# Patient Record
Sex: FEMALE | Race: WHITE | NOT HISPANIC OR LATINO | Employment: OTHER | URBAN - NONMETROPOLITAN AREA
[De-identification: names, ages, dates, MRNs, and addresses within clinical notes are randomized per-mention and may not be internally consistent; named-entity substitution may affect disease eponyms.]

---

## 2023-05-09 ENCOUNTER — OFFICE VISIT (OUTPATIENT)
Dept: CARDIOLOGY | Facility: CLINIC | Age: 68
End: 2023-05-09
Payer: MEDICARE

## 2023-05-09 VITALS
HEIGHT: 64 IN | OXYGEN SATURATION: 98 % | SYSTOLIC BLOOD PRESSURE: 130 MMHG | DIASTOLIC BLOOD PRESSURE: 72 MMHG | HEART RATE: 120 BPM

## 2023-05-09 DIAGNOSIS — R07.9 CHEST PAIN, UNSPECIFIED TYPE: Primary | ICD-10-CM

## 2023-05-09 DIAGNOSIS — R06.02 SHORTNESS OF BREATH: ICD-10-CM

## 2023-05-09 DIAGNOSIS — R60.0 LOWER EXTREMITY EDEMA: ICD-10-CM

## 2023-05-09 PROCEDURE — 99204 OFFICE O/P NEW MOD 45 MIN: CPT | Performed by: PHYSICIAN ASSISTANT

## 2023-05-09 RX ORDER — ATENOLOL 25 MG/1
25 TABLET ORAL DAILY
COMMUNITY

## 2023-05-09 RX ORDER — CYCLOBENZAPRINE HCL 10 MG
TABLET ORAL
COMMUNITY
Start: 2023-01-12

## 2023-05-09 RX ORDER — PRAVASTATIN SODIUM 10 MG
TABLET ORAL
COMMUNITY
Start: 2023-04-25

## 2023-05-09 RX ORDER — MORPHINE SULFATE 60 MG/1
1 TABLET, FILM COATED, EXTENDED RELEASE ORAL EVERY 12 HOURS SCHEDULED
COMMUNITY
Start: 2023-04-15

## 2023-05-09 RX ORDER — PANTOPRAZOLE SODIUM 40 MG/1
40 TABLET, DELAYED RELEASE ORAL DAILY
Qty: 30 TABLET | Refills: 5 | Status: SHIPPED | OUTPATIENT
Start: 2023-05-09

## 2023-05-09 RX ORDER — LEVOTHYROXINE SODIUM 0.1 MG/1
TABLET ORAL
COMMUNITY
Start: 2023-02-28

## 2023-05-09 RX ORDER — OXYCODONE HYDROCHLORIDE 10 MG/1
TABLET ORAL
COMMUNITY
Start: 2023-05-08

## 2023-05-09 RX ORDER — FESOTERODINE FUMARATE 4 MG/1
TABLET, EXTENDED RELEASE ORAL AS NEEDED
COMMUNITY
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
## 2023-08-10 ENCOUNTER — OFFICE VISIT (OUTPATIENT)
Dept: CARDIOLOGY | Facility: CLINIC | Age: 68
End: 2023-08-10
Payer: MEDICARE

## 2023-08-10 VITALS
DIASTOLIC BLOOD PRESSURE: 86 MMHG | WEIGHT: 207 LBS | HEIGHT: 64 IN | SYSTOLIC BLOOD PRESSURE: 130 MMHG | OXYGEN SATURATION: 97 % | HEART RATE: 98 BPM | BODY MASS INDEX: 35.34 KG/M2

## 2023-08-10 DIAGNOSIS — R60.0 LOWER EXTREMITY EDEMA: ICD-10-CM

## 2023-08-10 DIAGNOSIS — R06.02 SHORTNESS OF BREATH: ICD-10-CM

## 2023-08-10 DIAGNOSIS — R07.9 CHEST PAIN, UNSPECIFIED TYPE: Primary | ICD-10-CM

## 2023-08-10 PROCEDURE — 99214 OFFICE O/P EST MOD 30 MIN: CPT | Performed by: PHYSICIAN ASSISTANT
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
## 2023-09-19 ENCOUNTER — HOSPITAL ENCOUNTER (OUTPATIENT)
Dept: CARDIOLOGY | Facility: HOSPITAL | Age: 68
Discharge: HOME | End: 2023-09-19
Payer: MEDICARE

## 2023-09-19 DIAGNOSIS — R60.0 LOWER EXTREMITY EDEMA: ICD-10-CM

## 2023-09-19 DIAGNOSIS — R06.02 SHORTNESS OF BREATH: ICD-10-CM

## 2023-09-19 DIAGNOSIS — R07.9 CHEST PAIN, UNSPECIFIED TYPE: ICD-10-CM

## 2023-09-19 PROCEDURE — 78452 HT MUSCLE IMAGE SPECT MULT: CPT

## 2023-09-19 PROCEDURE — 0 TECHNETIUM SESTAMIBI: Performed by: INTERNAL MEDICINE

## 2023-09-19 PROCEDURE — A9500 TC99M SESTAMIBI: HCPCS | Performed by: INTERNAL MEDICINE

## 2023-09-19 PROCEDURE — 25010000002 REGADENOSON 0.4 MG/5ML SOLUTION: Performed by: INTERNAL MEDICINE

## 2023-09-19 PROCEDURE — 93306 TTE W/DOPPLER COMPLETE: CPT

## 2023-09-19 PROCEDURE — 93017 CV STRESS TEST TRACING ONLY: CPT

## 2023-09-19 RX ORDER — REGADENOSON 0.08 MG/ML
0.4 INJECTION, SOLUTION INTRAVENOUS
Status: COMPLETED | OUTPATIENT
Start: 2023-09-19 | End: 2023-09-19

## 2023-09-19 RX ADMIN — REGADENOSON 0.4 MG: 0.08 INJECTION, SOLUTION INTRAVENOUS at 11:15

## 2023-09-19 RX ADMIN — TECHNETIUM TC 99M SESTAMIBI 1 DOSE: 1 INJECTION INTRAVENOUS at 10:14

## 2023-09-19 RX ADMIN — TECHNETIUM TC 99M SESTAMIBI 1 DOSE: 1 INJECTION INTRAVENOUS at 11:15

## 2023-09-21 ENCOUNTER — TELEPHONE (OUTPATIENT)
Dept: CARDIOLOGY | Facility: CLINIC | Age: 68
End: 2023-09-21

## 2023-09-21 LAB
BH CV REST NUCLEAR ISOTOPE DOSE: 10 MCI
BH CV STRESS COMMENTS STAGE 1: NORMAL
BH CV STRESS DOSE REGADENOSON STAGE 1: 0.4
BH CV STRESS DURATION MIN STAGE 1: 0
BH CV STRESS DURATION SEC STAGE 1: 10
BH CV STRESS NUCLEAR ISOTOPE DOSE: 30 MCI
BH CV STRESS PROTOCOL 1: NORMAL
BH CV STRESS RECOVERY BP: NORMAL MMHG
BH CV STRESS RECOVERY HR: 109 BPM
BH CV STRESS STAGE 1: 1
MAXIMAL PREDICTED HEART RATE: 150 BPM
PERCENT MAX PREDICTED HR: 74 %
STRESS BASELINE BP: NORMAL MMHG
STRESS BASELINE HR: 102 BPM
STRESS PERCENT HR: 87 %
STRESS POST PEAK BP: NORMAL MMHG
STRESS POST PEAK HR: 111 BPM
STRESS TARGET HR: 128 BPM

## 2023-09-21 NOTE — TELEPHONE ENCOUNTER
Tried to contact patient no answer, not able to leave VM.    Patient aware will be called with jackelyn time and date. Sent to Jeanne for scheduling.           ----- Message from YESSI Retana sent at 9/21/2023  2:54 PM EDT -----  1 week follow-up    Result Text  1.  Scintigraphy demonstrates a small to moderately sized, moderately dense but completely reversible anterolateral defect compatible with ischemia.     2.  Preserved post stress ejection fraction of 66% with no focal wall motion abnormalities.     3.  Elevated transient ischemic dilation ratio of 1.24 suggestive of multivessel coronary disease.  No evidence of increased lung uptake of radiopharmaceutical to implicate increased LV filling pressures.

## 2023-09-23 LAB
BH CV ECHO MEAS - ACS: 2 CM
BH CV ECHO MEAS - AO MAX PG: 8.2 MMHG
BH CV ECHO MEAS - AO MEAN PG: 5 MMHG
BH CV ECHO MEAS - AO ROOT DIAM: 2.8 CM
BH CV ECHO MEAS - AO V2 MAX: 143 CM/SEC
BH CV ECHO MEAS - AO V2 VTI: 25.9 CM
BH CV ECHO MEAS - EDV(CUBED): 79.5 ML
BH CV ECHO MEAS - EDV(MOD-SP4): 69.8 ML
BH CV ECHO MEAS - EF(MOD-SP4): 74.9 %
BH CV ECHO MEAS - EF_3D-VOL: 55 %
BH CV ECHO MEAS - ESV(CUBED): 18.8 ML
BH CV ECHO MEAS - ESV(MOD-SP4): 17.5 ML
BH CV ECHO MEAS - FS: 38.1 %
BH CV ECHO MEAS - IVS/LVPW: 0.81 CM
BH CV ECHO MEAS - IVSD: 1.16 CM
BH CV ECHO MEAS - LA DIMENSION: 3.7 CM
BH CV ECHO MEAS - LAT PEAK E' VEL: 6.7 CM/SEC
BH CV ECHO MEAS - LV DIASTOLIC VOL/BSA (35-75): 35.2 CM2
BH CV ECHO MEAS - LV MASS(C)D: 207.8 GRAMS
BH CV ECHO MEAS - LV SYSTOLIC VOL/BSA (12-30): 8.8 CM2
BH CV ECHO MEAS - LVIDD: 4.3 CM
BH CV ECHO MEAS - LVIDS: 2.7 CM
BH CV ECHO MEAS - LVPWD: 1.44 CM
BH CV ECHO MEAS - MED PEAK E' VEL: 7.3 CM/SEC
BH CV ECHO MEAS - MV A MAX VEL: 96.8 CM/SEC
BH CV ECHO MEAS - MV DEC TIME: 0.17 SEC
BH CV ECHO MEAS - MV E MAX VEL: 114 CM/SEC
BH CV ECHO MEAS - MV E/A: 1.18
BH CV ECHO MEAS - SI(MOD-SP4): 26.3 ML/M2
BH CV ECHO MEAS - SV(MOD-SP4): 52.3 ML
BH CV ECHO MEASUREMENTS AVERAGE E/E' RATIO: 16.29
BH CV XLRA - RV BASE: 2.8 CM
BH CV XLRA - RV LENGTH: 7.1 CM
BH CV XLRA - RV MID: 1.96 CM
LEFT ATRIUM VOLUME INDEX: 14.2 ML/M2

## 2023-09-26 ENCOUNTER — OFFICE VISIT (OUTPATIENT)
Dept: CARDIOLOGY | Facility: CLINIC | Age: 68
End: 2023-09-26
Payer: MEDICARE

## 2023-09-26 VITALS
DIASTOLIC BLOOD PRESSURE: 79 MMHG | BODY MASS INDEX: 36.7 KG/M2 | OXYGEN SATURATION: 96 % | SYSTOLIC BLOOD PRESSURE: 142 MMHG | WEIGHT: 215 LBS | HEART RATE: 111 BPM | HEIGHT: 64 IN

## 2023-09-26 DIAGNOSIS — R94.39 ABNORMAL STRESS TEST: ICD-10-CM

## 2023-09-26 DIAGNOSIS — R06.02 SHORTNESS OF BREATH: ICD-10-CM

## 2023-09-26 DIAGNOSIS — R07.9 CHEST PAIN, UNSPECIFIED TYPE: Primary | ICD-10-CM

## 2023-09-26 PROCEDURE — 99214 OFFICE O/P EST MOD 30 MIN: CPT | Performed by: PHYSICIAN ASSISTANT

## 2023-09-26 RX ORDER — ASPIRIN 81 MG/1
81 TABLET ORAL DAILY
Qty: 30 TABLET | Refills: 5 | Status: SHIPPED | OUTPATIENT
Start: 2023-09-26 | End: 2023-09-28 | Stop reason: HOSPADM

## 2023-09-26 RX ORDER — NITROGLYCERIN 0.4 MG/1
TABLET SUBLINGUAL
Qty: 25 TABLET | Refills: 11 | Status: SHIPPED | OUTPATIENT
Start: 2023-09-26
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
## 2023-09-27 ENCOUNTER — PREP FOR SURGERY (OUTPATIENT)
Dept: OTHER | Facility: HOSPITAL | Age: 68
End: 2023-09-27
Payer: MEDICARE

## 2023-09-27 DIAGNOSIS — R07.9 CHEST PAIN, UNSPECIFIED TYPE: ICD-10-CM

## 2023-09-27 DIAGNOSIS — R94.39 ABNORMAL STRESS TEST: Primary | ICD-10-CM

## 2023-09-27 RX ORDER — SODIUM CHLORIDE 0.9 % (FLUSH) 0.9 %
10 SYRINGE (ML) INJECTION EVERY 12 HOURS SCHEDULED
Status: CANCELLED | OUTPATIENT
Start: 2023-09-27

## 2023-09-27 RX ORDER — SODIUM CHLORIDE 9 MG/ML
40 INJECTION, SOLUTION INTRAVENOUS AS NEEDED
Status: CANCELLED | OUTPATIENT
Start: 2023-09-27

## 2023-09-27 RX ORDER — BUSPIRONE HYDROCHLORIDE 5 MG/1
5 TABLET ORAL 3 TIMES DAILY PRN
Qty: 30 TABLET | Refills: 0 | Status: SHIPPED | OUTPATIENT
Start: 2023-09-27

## 2023-09-27 RX ORDER — SODIUM CHLORIDE 0.9 % (FLUSH) 0.9 %
10 SYRINGE (ML) INJECTION AS NEEDED
Status: CANCELLED | OUTPATIENT
Start: 2023-09-27

## 2023-09-27 RX ORDER — ASPIRIN 81 MG/1
324 TABLET, CHEWABLE ORAL ONCE
Status: CANCELLED | OUTPATIENT
Start: 2023-09-27 | End: 2023-09-27

## 2023-09-27 RX ORDER — ASPIRIN 81 MG/1
81 TABLET ORAL DAILY
Status: CANCELLED | OUTPATIENT
Start: 2023-09-28

## 2023-09-28 ENCOUNTER — HOSPITAL ENCOUNTER (OUTPATIENT)
Facility: HOSPITAL | Age: 68
Setting detail: HOSPITAL OUTPATIENT SURGERY
Discharge: HOME | End: 2023-09-28
Attending: INTERNAL MEDICINE | Admitting: INTERNAL MEDICINE
Payer: MEDICARE

## 2023-09-28 VITALS
BODY MASS INDEX: 34.82 KG/M2 | WEIGHT: 203.93 LBS | DIASTOLIC BLOOD PRESSURE: 93 MMHG | TEMPERATURE: 98.7 F | HEIGHT: 64 IN | OXYGEN SATURATION: 96 % | RESPIRATION RATE: 18 BRPM | SYSTOLIC BLOOD PRESSURE: 122 MMHG | HEART RATE: 98 BPM

## 2023-09-28 DIAGNOSIS — R06.02 SHORTNESS OF BREATH: ICD-10-CM

## 2023-09-28 DIAGNOSIS — E78.5 HYPERLIPIDEMIA LDL GOAL <70: ICD-10-CM

## 2023-09-28 DIAGNOSIS — I50.33 ACUTE ON CHRONIC HEART FAILURE WITH PRESERVED EJECTION FRACTION (HFPEF): Primary | ICD-10-CM

## 2023-09-28 DIAGNOSIS — R94.39 ABNORMAL STRESS TEST: ICD-10-CM

## 2023-09-28 DIAGNOSIS — R07.9 CHEST PAIN, UNSPECIFIED TYPE: ICD-10-CM

## 2023-09-28 PROBLEM — I20.9 ANGINA PECTORIS: Status: ACTIVE | Noted: 2023-05-09

## 2023-09-28 PROBLEM — I20.89 ATYPICAL ANGINA: Status: ACTIVE | Noted: 2023-05-09

## 2023-09-28 PROBLEM — I25.119 CORONARY ARTERY DISEASE INVOLVING NATIVE CORONARY ARTERY OF NATIVE HEART WITH ANGINA PECTORIS: Status: ACTIVE | Noted: 2023-05-09

## 2023-09-28 PROBLEM — I10 ESSENTIAL HYPERTENSION: Status: ACTIVE | Noted: 2023-09-28

## 2023-09-28 PROBLEM — R60.0 LOWER EXTREMITY EDEMA: Status: RESOLVED | Noted: 2023-05-09 | Resolved: 2023-09-28

## 2023-09-28 LAB
ALBUMIN SERPL-MCNC: 4.1 G/DL (ref 3.5–5.2)
ALBUMIN/GLOB SERPL: 1.4 G/DL
ALP SERPL-CCNC: 93 U/L (ref 39–117)
ALT SERPL W P-5'-P-CCNC: 14 U/L (ref 1–33)
ANION GAP SERPL CALCULATED.3IONS-SCNC: 15 MMOL/L (ref 5–15)
ANION GAP SERPL CALCULATED.3IONS-SCNC: 16 MMOL/L (ref 5–15)
AST SERPL-CCNC: 17 U/L (ref 1–32)
BILIRUB SERPL-MCNC: 0.2 MG/DL (ref 0–1.2)
BUN BLDA-MCNC: 10 MG/DL (ref 8–26)
BUN SERPL-MCNC: 11 MG/DL (ref 8–23)
BUN SERPL-MCNC: 11 MG/DL (ref 8–23)
BUN/CREAT SERPL: 16.4 (ref 7–25)
BUN/CREAT SERPL: 16.4 (ref 7–25)
CA-I BLDA-SCNC: 1.16 MMOL/L (ref 1.2–1.32)
CALCIUM SPEC-SCNC: 9.1 MG/DL (ref 8.6–10.5)
CALCIUM SPEC-SCNC: 9.3 MG/DL (ref 8.6–10.5)
CHLORIDE BLDA-SCNC: 108 MMOL/L (ref 98–109)
CHLORIDE SERPL-SCNC: 109 MMOL/L (ref 98–107)
CHLORIDE SERPL-SCNC: 109 MMOL/L (ref 98–107)
CHOLEST SERPL-MCNC: 163 MG/DL (ref 0–200)
CO2 BLDA-SCNC: 21 MMOL/L (ref 24–29)
CO2 SERPL-SCNC: 20 MMOL/L (ref 22–29)
CO2 SERPL-SCNC: 21 MMOL/L (ref 22–29)
CREAT BLDA-MCNC: 0.7 MG/DL (ref 0.6–1.3)
CREAT SERPL-MCNC: 0.67 MG/DL (ref 0.57–1)
CREAT SERPL-MCNC: 0.67 MG/DL (ref 0.57–1)
DEPRECATED RDW RBC AUTO: 44.8 FL (ref 37–54)
EGFRCR SERPLBLD CKD-EPI 2021: 93.2 ML/MIN/1.73
EGFRCR SERPLBLD CKD-EPI 2021: 94.2 ML/MIN/1.73
EGFRCR SERPLBLD CKD-EPI 2021: 94.2 ML/MIN/1.73
ERYTHROCYTE [DISTWIDTH] IN BLOOD BY AUTOMATED COUNT: 12.8 % (ref 12.3–15.4)
GLOBULIN UR ELPH-MCNC: 3 GM/DL
GLUCOSE BLDC GLUCOMTR-MCNC: 122 MG/DL (ref 70–130)
GLUCOSE SERPL-MCNC: 124 MG/DL (ref 65–99)
GLUCOSE SERPL-MCNC: 130 MG/DL (ref 65–99)
HBA1C MFR BLD: 5.1 % (ref 4.8–5.6)
HCT VFR BLD AUTO: 43.8 % (ref 34–46.6)
HCT VFR BLDA CALC: 43 % (ref 38–51)
HDLC SERPL-MCNC: 65 MG/DL (ref 40–60)
HGB BLD-MCNC: 14.4 G/DL (ref 12–15.9)
HGB BLDA-MCNC: 14.6 G/DL (ref 12–17)
LDLC SERPL CALC-MCNC: 79 MG/DL (ref 0–100)
LDLC/HDLC SERPL: 1.18 {RATIO}
MCH RBC QN AUTO: 31.2 PG (ref 26.6–33)
MCHC RBC AUTO-ENTMCNC: 32.9 G/DL (ref 31.5–35.7)
MCV RBC AUTO: 94.8 FL (ref 79–97)
PLATELET # BLD AUTO: 344 10*3/MM3 (ref 140–450)
PMV BLD AUTO: 8.8 FL (ref 6–12)
POTASSIUM BLDA-SCNC: 3.1 MMOL/L (ref 3.5–4.9)
POTASSIUM SERPL-SCNC: 3.3 MMOL/L (ref 3.5–5.2)
POTASSIUM SERPL-SCNC: 3.3 MMOL/L (ref 3.5–5.2)
PROT SERPL-MCNC: 7.1 G/DL (ref 6–8.5)
RBC # BLD AUTO: 4.62 10*6/MM3 (ref 3.77–5.28)
SODIUM BLD-SCNC: 143 MMOL/L (ref 138–146)
SODIUM SERPL-SCNC: 145 MMOL/L (ref 136–145)
SODIUM SERPL-SCNC: 145 MMOL/L (ref 136–145)
TRIGL SERPL-MCNC: 107 MG/DL (ref 0–150)
VLDLC SERPL-MCNC: 19 MG/DL (ref 5–40)
WBC NRBC COR # BLD: 12.33 10*3/MM3 (ref 3.4–10.8)

## 2023-09-28 PROCEDURE — 85027 COMPLETE CBC AUTOMATED: CPT

## 2023-09-28 PROCEDURE — 83036 HEMOGLOBIN GLYCOSYLATED A1C: CPT

## 2023-09-28 PROCEDURE — 80047 BASIC METABLC PNL IONIZED CA: CPT

## 2023-09-28 PROCEDURE — C1894 INTRO/SHEATH, NON-LASER: HCPCS | Performed by: INTERNAL MEDICINE

## 2023-09-28 PROCEDURE — 25010000002 MIDAZOLAM PER 1 MG: Performed by: INTERNAL MEDICINE

## 2023-09-28 PROCEDURE — 25010000002 FENTANYL CITRATE (PF) 50 MCG/ML SOLUTION: Performed by: INTERNAL MEDICINE

## 2023-09-28 PROCEDURE — 80053 COMPREHEN METABOLIC PANEL: CPT

## 2023-09-28 PROCEDURE — C1769 GUIDE WIRE: HCPCS | Performed by: INTERNAL MEDICINE

## 2023-09-28 PROCEDURE — 25510000001 IOPAMIDOL PER 1 ML: Performed by: INTERNAL MEDICINE

## 2023-09-28 PROCEDURE — 80061 LIPID PANEL: CPT

## 2023-09-28 PROCEDURE — 25010000002 HEPARIN (PORCINE) PER 1000 UNITS: Performed by: INTERNAL MEDICINE

## 2023-09-28 PROCEDURE — 93458 L HRT ARTERY/VENTRICLE ANGIO: CPT | Performed by: INTERNAL MEDICINE

## 2023-09-28 PROCEDURE — 85014 HEMATOCRIT: CPT

## 2023-09-28 RX ORDER — POTASSIUM CHLORIDE 750 MG/1
20 CAPSULE, EXTENDED RELEASE ORAL ONCE
Status: COMPLETED | OUTPATIENT
Start: 2023-09-28 | End: 2023-09-28

## 2023-09-28 RX ORDER — LIDOCAINE HYDROCHLORIDE 10 MG/ML
INJECTION, SOLUTION EPIDURAL; INFILTRATION; INTRACAUDAL; PERINEURAL
Status: DISCONTINUED | OUTPATIENT
Start: 2023-09-28 | End: 2023-09-28 | Stop reason: HOSPADM

## 2023-09-28 RX ORDER — ASPIRIN 81 MG/1
324 TABLET, CHEWABLE ORAL ONCE
Status: COMPLETED | OUTPATIENT
Start: 2023-09-28 | End: 2023-09-28

## 2023-09-28 RX ORDER — SODIUM CHLORIDE 0.9 % (FLUSH) 0.9 %
10 SYRINGE (ML) INJECTION AS NEEDED
Status: DISCONTINUED | OUTPATIENT
Start: 2023-09-28 | End: 2023-09-28 | Stop reason: HOSPADM

## 2023-09-28 RX ORDER — DIAZEPAM 5 MG/1
5 TABLET ORAL ONCE
Status: COMPLETED | OUTPATIENT
Start: 2023-09-28 | End: 2023-09-28

## 2023-09-28 RX ORDER — FENTANYL CITRATE 50 UG/ML
INJECTION, SOLUTION INTRAMUSCULAR; INTRAVENOUS
Status: DISCONTINUED | OUTPATIENT
Start: 2023-09-28 | End: 2023-09-28 | Stop reason: HOSPADM

## 2023-09-28 RX ORDER — HEPARIN SODIUM 1000 [USP'U]/ML
INJECTION, SOLUTION INTRAVENOUS; SUBCUTANEOUS
Status: DISCONTINUED | OUTPATIENT
Start: 2023-09-28 | End: 2023-09-28 | Stop reason: HOSPADM

## 2023-09-28 RX ORDER — MIDAZOLAM HYDROCHLORIDE 1 MG/ML
INJECTION INTRAMUSCULAR; INTRAVENOUS
Status: DISCONTINUED | OUTPATIENT
Start: 2023-09-28 | End: 2023-09-28 | Stop reason: HOSPADM

## 2023-09-28 RX ORDER — FUROSEMIDE 20 MG/1
20 TABLET ORAL DAILY
Qty: 30 TABLET | Refills: 1 | Status: SHIPPED | OUTPATIENT
Start: 2023-09-28

## 2023-09-28 RX ORDER — SODIUM CHLORIDE 9 MG/ML
40 INJECTION, SOLUTION INTRAVENOUS AS NEEDED
Status: DISCONTINUED | OUTPATIENT
Start: 2023-09-28 | End: 2023-09-28 | Stop reason: HOSPADM

## 2023-09-28 RX ORDER — ROSUVASTATIN CALCIUM 10 MG/1
10 TABLET, COATED ORAL DAILY
Qty: 30 TABLET | Refills: 1 | Status: SHIPPED | OUTPATIENT
Start: 2023-09-28

## 2023-09-28 RX ORDER — SODIUM CHLORIDE 0.9 % (FLUSH) 0.9 %
10 SYRINGE (ML) INJECTION EVERY 12 HOURS SCHEDULED
Status: DISCONTINUED | OUTPATIENT
Start: 2023-09-28 | End: 2023-09-28 | Stop reason: HOSPADM

## 2023-09-28 RX ORDER — POTASSIUM CHLORIDE 750 MG/1
10 TABLET, FILM COATED, EXTENDED RELEASE ORAL DAILY
Qty: 30 TABLET | Refills: 1 | Status: SHIPPED | OUTPATIENT
Start: 2023-09-28

## 2023-09-28 RX ORDER — NICARDIPINE HCL-0.9% SOD CHLOR 1 MG/10 ML
SYRINGE (ML) INTRAVENOUS
Status: DISCONTINUED | OUTPATIENT
Start: 2023-09-28 | End: 2023-09-28 | Stop reason: HOSPADM

## 2023-09-28 RX ORDER — ASPIRIN 81 MG/1
81 TABLET ORAL DAILY
Status: DISCONTINUED | OUTPATIENT
Start: 2023-09-29 | End: 2023-09-28 | Stop reason: HOSPADM

## 2023-09-28 RX ADMIN — POTASSIUM CHLORIDE 20 MEQ: 750 CAPSULE, EXTENDED RELEASE ORAL at 10:21

## 2023-09-28 RX ADMIN — SODIUM CHLORIDE 292.5 ML: 9 INJECTION, SOLUTION INTRAVENOUS at 10:02

## 2023-09-28 RX ADMIN — ASPIRIN 81 MG CHEWABLE TABLET 324 MG: 81 TABLET CHEWABLE at 10:01

## 2023-09-28 RX ADMIN — DIAZEPAM 5 MG: 5 TABLET ORAL at 10:21

## 2023-09-28 NOTE — INTERVAL H&P NOTE
H&P reviewed. The patient was examined and there are no changes to the H&P.      Only change physical exam is patient is noted to be tachycardic on telemetry and hypertensive.  Otherwise no changes      Active Hospital Problems    Diagnosis     **Angina pectoris      Pharmacologic nuclear stress (9/2023): Anterior lateral ischemia. TID present.  Echo (9/19/2023): LVEF 58%.  Grade 2 diastolic dysfunction.  No significant valvular abnormality      Abnormal stress test      Pharmacologic nuclear stress (9/2023): Anterior lateral ischemia. TID present.  Echo (9/19/2023): LVEF 58%.  Grade 2 diastolic dysfunction.  No significant valvular abnormality      Shortness of breath     Essential hypertension     Dyslipidemia    Patient is a 68-year-old female who is being referred by YESSI Wallis to undergo cardiac catheterization for abnormal stress test that suggest anterior lateral ischemia and 3 times daily present raising possibility of multivessel CAD.  The patient has been having intermittent chest pressure and shortness of breath with exertional activities.  The risks and benefits of the procedure were discussed and the patient is agreeable to proceed.  Of note she has been taking daily 81 mg aspirin but has never been on a P2 Y12 inhibitor.      Plan:  Lab results pending and if acceptable proceed with cardiac cath via the right radial approach  Patient to take home dose of atenolol now  Give 5 mg p.o. Valium x1 now  Further recommendations to follow    REED Garcia

## 2023-10-11 ENCOUNTER — OFFICE VISIT (OUTPATIENT)
Dept: CARDIOLOGY | Facility: CLINIC | Age: 68
End: 2023-10-11
Payer: MEDICARE

## 2023-10-11 ENCOUNTER — TELEPHONE (OUTPATIENT)
Dept: CARDIOLOGY | Facility: CLINIC | Age: 68
End: 2023-10-11

## 2023-10-11 VITALS
SYSTOLIC BLOOD PRESSURE: 126 MMHG | DIASTOLIC BLOOD PRESSURE: 70 MMHG | HEIGHT: 64 IN | HEART RATE: 107 BPM | WEIGHT: 204 LBS | BODY MASS INDEX: 34.83 KG/M2 | OXYGEN SATURATION: 96 %

## 2023-10-11 DIAGNOSIS — I50.32 CHRONIC DIASTOLIC HEART FAILURE: ICD-10-CM

## 2023-10-11 DIAGNOSIS — I25.10 CORONARY ARTERY DISEASE INVOLVING NATIVE CORONARY ARTERY OF NATIVE HEART WITHOUT ANGINA PECTORIS: Primary | ICD-10-CM

## 2023-10-11 DIAGNOSIS — E78.00 PURE HYPERCHOLESTEROLEMIA: ICD-10-CM

## 2023-10-11 PROCEDURE — 3074F SYST BP LT 130 MM HG: CPT | Performed by: PHYSICIAN ASSISTANT

## 2023-10-11 PROCEDURE — 3078F DIAST BP <80 MM HG: CPT | Performed by: PHYSICIAN ASSISTANT

## 2023-10-11 PROCEDURE — 99214 OFFICE O/P EST MOD 30 MIN: CPT | Performed by: PHYSICIAN ASSISTANT

## 2023-10-11 RX ORDER — PRAVASTATIN SODIUM 10 MG
10 TABLET ORAL DAILY
Qty: 90 TABLET | Refills: 3 | Status: SHIPPED | OUTPATIENT
Start: 2023-10-11

## 2023-10-11 NOTE — TELEPHONE ENCOUNTER
Notified patient that potassium supplement is noted on med list and Bill Mueller PA-C is aware. Potassium low on recent labs. Patient has active lab orders to repeat. Explained the benefits of Entresto to patient.
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
## 2023-10-11 NOTE — TELEPHONE ENCOUNTER
Caller: Alida Phillips    Relationship: Self    Best call back number: [phone]    What medications are you currently taking:   Current Outpatient Medications on File Prior to Visit   Medication Sig Dispense Refill    atenolol (TENORMIN) 25 MG tablet Take 1 tablet by mouth Daily.      cyclobenzaprine (FLEXERIL) 10 MG tablet Take 1 tablet by mouth 3 (Three) Times a Day As Needed.      fesoterodine fumarate (TOVIAZ ER) 4 MG tablet sustained-release 24 hour tablet Take  by mouth As Needed.      furosemide (LASIX) 20 MG tablet Take 1 tablet by mouth Daily. 30 tablet 1    levothyroxine (SYNTHROID, LEVOTHROID) 100 MCG tablet Take 1 tablet by mouth Daily.      Morphine (MS CONTIN) 60 MG 12 hr tablet Take 1 tablet by mouth Every 12 (Twelve) Hours.      nitroglycerin (NITROSTAT) 0.4 MG SL tablet 1 under the tongue as needed for angina, may repeat q5mins for up three doses 25 tablet 11    oxyCODONE (ROXICODONE) 10 MG tablet Take 1 tablet by mouth Every 6 (Six) Hours As Needed.      pantoprazole (Protonix) 40 MG EC tablet Take 1 tablet by mouth Daily. 30 tablet 5    potassium chloride 10 MEQ CR tablet Take 1 tablet by mouth Daily. 30 tablet 1    pravastatin (PRAVACHOL) 10 MG tablet Take 1 tablet by mouth Daily. 90 tablet 3    sacubitril-valsartan (ENTRESTO) 24-26 MG tablet Take 1 tablet by mouth 2 (Two) Times a Day. 180 tablet 3    sacubitril-valsartan (ENTRESTO) 24-26 MG tablet Take 1 tablet by mouth 2 (Two) Times a Day. 60 tablet 0    Semaglutide,0.25 or 0.5MG/DOS, (OZEMPIC) 2 MG/3ML solution pen-injector Inject 0.25 mg under the skin into the appropriate area as directed 1 (One) Time Per Week. 1 mL 11    [DISCONTINUED] busPIRone (BUSPAR) 5 MG tablet Take 1 tablet by mouth 3 (Three) Times a Day As Needed (anxiety). 30 tablet 0    [DISCONTINUED] empagliflozin (Jardiance) 10 MG tablet tablet Take 1 tablet by mouth Daily. 90 tablet 1    [DISCONTINUED] rosuvastatin (CRESTOR) 10 MG tablet Take 1 tablet by mouth Daily. 30  tablet 1    [DISCONTINUED] Semaglutide,0.25 or 0.5MG/DOS, (OZEMPIC) 2 MG/3ML solution pen-injector Inject 0.25 mg under the skin into the appropriate area as directed 1 (One) Time Per Week. 1 mL 11     No current facility-administered medications on file prior to visit.          When did you start taking these medications: HASN'T PICKED UP THE MEDICATION    Which medication are you concerned about: JASONSTO    Who prescribed you this medication: MELY BABIN    What are your concerns: PATIENT TAKES A POTASSIUM SUPPLEMENT AND SHE STATED SHE WOULD HAVE TO BE MONITORED ON THIS MEDICATION (ENTRESTO) AND THAT WAS NEVER DISCUSSED. SHE WANTS TO VERIFY THAT MELY IS AWARE OF THE POTASSIUM SUPPLEMENT BEFORE SHE TAKES THE ENTRESTO PLEASE CALL HER    How long have you had these concerns: TODAY
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
## 2023-10-12 ENCOUNTER — TELEPHONE (OUTPATIENT)
Dept: CARDIOLOGY | Facility: CLINIC | Age: 68
End: 2023-10-12
Payer: MEDICARE

## 2023-10-12 NOTE — TELEPHONE ENCOUNTER
Advised to have prior authorization sent to PCP or have PCP prescribe medication with it not being cardiac related.

## 2023-10-12 NOTE — TELEPHONE ENCOUNTER
Caller: Alida Phillips    Relationship: Self    Best call back number: [phone]    What was the call regarding: PATIENT WAS NOTIFIED BY [facility] THAT THE OZEMPIC NEEDS A PRIOR AUTHORIZATION.

## 2023-10-23 ENCOUNTER — TELEPHONE (OUTPATIENT)
Dept: CARDIOLOGY | Facility: CLINIC | Age: 68
End: 2023-10-23

## 2023-10-23 NOTE — TELEPHONE ENCOUNTER
----- Message from YESSI Retana sent at 10/23/2023 10:16 AM EDT -----  Regarding: MJ: Ganesh  Contact: [phone]  Can you call her? Let her know that I am aware of the entresto and potassium. We generally order labs after we start someone on it in like a week or two to ensure her potassium and electrolytes stay within limits.  If that was not done, can you do that?  Let her know that symptoms we are busy and can always get to the phone but she can send me a message through [employer] and I can respond  ----- Message -----  From: Aleida Arango RegSched Rep  Sent: 10/23/2023   8:27 AM EDT  To: YESSI Retana; Rachel Garrido MA  Subject: FW: Entresto                                       ----- Message -----  From: Alida Phillips  Sent: 10/22/2023   5:35 PM EDT  To: [facility]  Subject: Entresto                                         Entresto warns against taking with potassium supplements and even states again that it is a severe warning. I tried to call & speak with you Bill but was told by secretaries that the warning was in the chart so you, Bill are aware of the severe warning by the Entresto people.  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 Therefore I am taking Jardiance 10 mg as Dr Montalvo prescribed for me.  The other medications remain the same, but the secretaries declined to persue the Ozempic because even though you ordered the medication they decided that that type of medication isn’t dealt with there. No problem.  Bill I am quite fond of you and Rachel, I do hope in the future to have access to one of you for my medical questions.

## 2023-10-23 NOTE — TELEPHONE ENCOUNTER
Patient made aware of recommendations. Wants to know if she can just stay on the Jardiance, she never started the Entresto.

## 2023-10-30 ENCOUNTER — TELEPHONE (OUTPATIENT)
Dept: CARDIOLOGY | Facility: CLINIC | Age: 68
End: 2023-10-30
Payer: MEDICARE

## 2023-10-30 NOTE — TELEPHONE ENCOUNTER
Sent to Pharmacy, patient aware. While speaking to patient, she wanted me to discuss how to use nitro. I advised instructions, she states she had a pain in breast area and was concerned it was her heart, she is aware with any new or worsening sx go to ER.    Rachel Garrido MA

## 2023-10-30 NOTE — TELEPHONE ENCOUNTER
----- Message from Alida Phillips sent at 10/28/2023  9:08 PM EDT -----  Regarding: Refill  Contact: [phone]  Bill I need a prescription for djardiance 10mg 1x daily [facility]. I usually get a 90 day supply with 3 refills. Can you do this? If not who should I contact. I have about a 30 day supply but [facility] takes about 2-3 weeks to deliver. Thank you. Alida Phillips

## 2023-11-01 ENCOUNTER — TELEPHONE (OUTPATIENT)
Dept: CARDIOLOGY | Facility: CLINIC | Age: 68
End: 2023-11-01
Payer: MEDICARE

## 2023-11-01 NOTE — TELEPHONE ENCOUNTER
----- Message from Mary Bell sent at 10/31/2023  4:40 PM EDT -----  Regarding: FW: Follow up refill question for Rachel  Contact: [phone]    ----- Message -----  From: Alida Phillips  Sent: 10/31/2023   4:27 PM EDT  To: e Card [facility]  Subject: Follow up refill question for Rachel Ramos could you give me a quick call about my potassium? No emergency, just follow up. I am supposed to take potassium everyday? Thanks

## 2023-11-01 NOTE — TELEPHONE ENCOUNTER
Called and notified patient, per chart K daily, along with Lasix ordered by .      Rachel Garrido MA

## 2023-11-20 ENCOUNTER — TELEPHONE (OUTPATIENT)
Dept: CARDIOLOGY | Facility: CLINIC | Age: 68
End: 2023-11-20
Payer: MEDICARE

## 2023-11-20 NOTE — TELEPHONE ENCOUNTER
I called and made patient aware of recommendations. She states would like me to speak with Bill on Entresto and with thierry HALL.

## 2023-11-20 NOTE — TELEPHONE ENCOUNTER
----- Message from YESSI Retana sent at 11/20/2023  4:02 PM EST -----  Regarding: FW: Djardiance or other medication side effect  Contact: [phone]  I do not know of any issues of Jardiance causing joint issues.  The Lasix is because of the heart failure.  I would rather her be on Entresto then Jardiance.  I know she had read the effects of Entresto and was concerned.  It is one of the best medicines on the market for this condition and all the [facility] [place] is like [facility] and [facility] use that medication.  If she wants to stop Jardiance, she can and let us know if her symptoms improve.  ----- Message -----  From: Carol Davis LPN  Sent: 11/20/2023  10:56 AM EST  To: YESSI Retana  Subject: FW: Djardiance or other medication side effe#      ----- Message -----  From: Alida Phillips  Sent: 11/20/2023  10:51 AM EST  To: Ava Gonsales [facility]  Subject: Djardiance or other medication side effect       Bill, please read my last message about the possible side effect of Djardiance. Since then the Doxycycline has helped the toe that exploded with blood & pus. The great toe remains sore in what I would describe as the tendons. Last night I experienced soreness in the joint at the base of my left thumb. Is it possible that one of the new “heart” meds could be causing this attack on my joints. Or one of the meds added by you cardiac  docs such as potassium or lasik? Please let me know as this is painful and alarming. If not I can work with my family doc, but if it is one of the new meds I want it changed please.

## 2023-11-22 NOTE — TELEPHONE ENCOUNTER
Patient states she does not want to make any changes to her medication at this time, Bill stated if she was willing to start Entresto her K would be monitored. She verbally understands, and would like to just stick with Jardiance til her next follow up with him. Will call office with anymore questions or concerns.    Rachel Garrido MA

## 2023-11-28 ENCOUNTER — TELEPHONE (OUTPATIENT)
Dept: CARDIOLOGY | Facility: CLINIC | Age: 68
End: 2023-11-28
Payer: MEDICARE

## 2023-11-28 NOTE — TELEPHONE ENCOUNTER
LABS  Pt notified of no acute findings. Provider will discuss results at f/u. Pt reminded of appt date and time.  ----- Message from Rachel Garrido MA sent at 11/27/2023  9:31 AM EST -----    ----- Message -----  From: Bill Mueller PA  Sent: 11/27/2023   9:22 AM EST  To: Rachel Garrido MA    Let her know that her labs look good

## 2023-12-13 ENCOUNTER — TELEPHONE (OUTPATIENT)
Dept: CARDIOLOGY | Facility: CLINIC | Age: 68
End: 2023-12-13
Payer: MEDICARE

## 2023-12-13 NOTE — TELEPHONE ENCOUNTER
----- Message from Alida Phillips sent at 12/13/2023  1:26 PM EST -----  Regarding: Medication change?  Contact: [phone]  Dr Billy Khan gave me 2 medication changes. I want to run them by you. I have the medications but will wait for your response before I take them. She changed my ateneol because she felt my heart rate was too high & felt I needed an extended release. Heart rate was 110-115. She also gave me something other than lasik.  You know I find that very limiting in my life.She prescribed Toprol ER tabs 50 mg and Demadex 20mg.  Before I started taking these meds and deleting the lasik & Atenol  I wanted to alert you & get your input. Thank you so much. Caitie Espinoza! Alida Phillips

## 2023-12-14 RX ORDER — TORSEMIDE 20 MG/1
20 TABLET ORAL DAILY
COMMUNITY

## 2023-12-14 RX ORDER — METOPROLOL SUCCINATE 50 MG/1
50 TABLET, EXTENDED RELEASE ORAL DAILY
COMMUNITY

## 2024-01-04 ENCOUNTER — TELEPHONE (OUTPATIENT)
Dept: PULMONOLOGY | Facility: CLINIC | Age: 69
End: 2024-01-04
Payer: MEDICARE

## 2024-01-04 NOTE — TELEPHONE ENCOUNTER
----- Message from Alida Phillips sent at 2024 10:42 AM EST -----  Regardinweeks ago my pulse oxygen dropped significantly during the day time.  Contact: [phone]  Bill I went to see Dr Cervantes my family physician. My pulse/oxygen was 85 during the daytime. My heart rate was 115. Dr Cervantes said the Atenol that I take must be taken twice daily. This has contained my heart rate in the 75-84 range at all times. She ordered 2 sleep studies. 1 which I have completed showed that my pulse oxygen was falling below 85 at night so Dr Cervantes ordered oxygen for use at home.  The other test equipment should arrive today or tomorrow. I should have results by the time of our appointment.  I have had 2 episodes of chest  pain.  The first required 2 nitro tabs and the second requiring only 1 nitro. Both of these episodes occurred within the last week prior to my receiving home oxygen. I have a follow up with Dr Cervantes tomorrow.

## 2024-01-04 NOTE — TELEPHONE ENCOUNTER
I called patient she does not want to take Entresto .She also does not to increase demadex. She said that her ankles are down and she has no swelling.

## 2024-01-08 ENCOUNTER — TELEPHONE (OUTPATIENT)
Dept: CARDIOLOGY | Facility: CLINIC | Age: 69
End: 2024-01-08
Payer: MEDICARE

## 2024-01-08 NOTE — TELEPHONE ENCOUNTER
Caller: Alida Phillips    Relationship to patient: Self    Best call back number: [phone]    Chief complaint: CHEST PAINS & CHF    Type of visit: F/U APPT    Requested date: NEXT AVAILABLE     If rescheduling, when is the original appointment: 04-30-24     Additional notes:NOTHING AVAILABLE UNTIL 04-30-23. PLEASE CONTACT PATIENT WITH AN EARLIER DATE.

## 2024-01-11 ENCOUNTER — OFFICE VISIT (OUTPATIENT)
Dept: CARDIOLOGY | Facility: CLINIC | Age: 69
End: 2024-01-11
Payer: MEDICARE

## 2024-01-11 VITALS
BODY MASS INDEX: 35 KG/M2 | HEIGHT: 64 IN | OXYGEN SATURATION: 94 % | DIASTOLIC BLOOD PRESSURE: 79 MMHG | WEIGHT: 205 LBS | HEART RATE: 92 BPM | SYSTOLIC BLOOD PRESSURE: 134 MMHG

## 2024-01-11 DIAGNOSIS — Z99.81 CHRONIC RESPIRATORY FAILURE WITH HYPOXIA, ON HOME O2 THERAPY: Primary | ICD-10-CM

## 2024-01-11 DIAGNOSIS — I50.32 CHRONIC DIASTOLIC HEART FAILURE: ICD-10-CM

## 2024-01-11 DIAGNOSIS — I25.119 CORONARY ARTERY DISEASE INVOLVING NATIVE CORONARY ARTERY OF NATIVE HEART WITH ANGINA PECTORIS: ICD-10-CM

## 2024-01-11 DIAGNOSIS — R60.0 LOWER EXTREMITY EDEMA: ICD-10-CM

## 2024-01-11 DIAGNOSIS — M79.673 PAIN OF FOOT, UNSPECIFIED LATERALITY: ICD-10-CM

## 2024-01-11 DIAGNOSIS — J96.11 CHRONIC RESPIRATORY FAILURE WITH HYPOXIA, ON HOME O2 THERAPY: Primary | ICD-10-CM

## 2024-01-11 PROCEDURE — 93000 ELECTROCARDIOGRAM COMPLETE: CPT | Performed by: PHYSICIAN ASSISTANT

## 2024-01-11 PROCEDURE — 3078F DIAST BP <80 MM HG: CPT | Performed by: PHYSICIAN ASSISTANT

## 2024-01-11 PROCEDURE — 3075F SYST BP GE 130 - 139MM HG: CPT | Performed by: PHYSICIAN ASSISTANT

## 2024-01-11 PROCEDURE — 99214 OFFICE O/P EST MOD 30 MIN: CPT | Performed by: PHYSICIAN ASSISTANT
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
## 2024-08-26 RX ORDER — EMPAGLIFLOZIN 10 MG/1
10 TABLET, FILM COATED ORAL DAILY
Qty: 90 TABLET | Refills: 0 | Status: SHIPPED | OUTPATIENT
Start: 2024-08-26

## 2025-03-07 RX ORDER — PRAVASTATIN SODIUM 10 MG
10 TABLET ORAL DAILY
Qty: 90 TABLET | Refills: 3 | Status: SHIPPED | OUTPATIENT
Start: 2025-03-07

## 2025-03-07 RX ORDER — EMPAGLIFLOZIN 10 MG/1
10 TABLET, FILM COATED ORAL DAILY
Qty: 90 TABLET | Refills: 3 | Status: SHIPPED | OUTPATIENT
Start: 2025-03-07

## 2025-03-12 PROBLEM — I87.2 CHRONIC VENOUS INSUFFICIENCY OF LOWER EXTREMITY: Status: ACTIVE | Noted: 2025-03-12

## 2025-03-12 NOTE — ASSESSMENT & PLAN NOTE
Needs lipid profile.  Continue Pravastatin.    Orders:    Hepatic Function Panel; Future    High Sensitivity CRP; Future    Lipoprotein A (LPA); Future    Lipid Panel; Future     Emily SARGENT from [place] Tay SARGENT

## 2025-03-12 NOTE — PROGRESS NOTES
"    Cardiology Established Patient Note     Name: Alida Phillips  :   1955  PCP: Judith Cervantes MD  Date:   2025  Department: [facility] CARDIOLOGY  [address] [place] [address] [address]  [facility] [address]  Fax [phone]  Phone [phone]    Chief Complaint:    Subjective     History of Present Illness  Alida Phillips is a 70 y.o. female who presents today for follow-up.  Patient has past nonobstructive coronary artery disease, chronic diastolic heart failure, hyperlipidemia, hypertension, status post above-the-knee amputation, chronic respiratory failure. Still SOB with any activity at times at rest.  Her CVI is stable and RLE is better with compression stockings.    The following data was reviewed by: Quyen Smith MD on 2025:    Left heart cath 2023 EF 58%, mild coronary artery disease, severely elevated LV filling pressure  2D echo 10/2024 EF 70%  2024 MCT sinus rhythm/sinus tachycardia  Right lower extremity venous duplex  2024 deep system reflux was absent, negative for DVT, CVI noted in the right SFJ and GSV.    Lab Results   Component Value Date    GLUCOSE 130 (H) 2023    GLUCOSE 124 (H) 2023    BUN 11 2023    BUN 11 2023    CREATININE 0.70 2023    BCR 16.4 2023    BCR 16.4 2023    K 3.3 (L) 2023    K 3.3 (L) 2023    CO2 21.0 (L) 2023    CO2 20.0 (L) 2023    CALCIUM 9.1 2023    CALCIUM 9.3 2023    ALBUMIN 4.1 2023    AST 17 2023    ALT 14 2023     Lab Results   Component Value Date    CHOL 163 2023    TRIG 107 2023    HDL 65 (H) 2023    LDL 79 2023      Lab Results   Component Value Date    WBC 12.33 (H) 2023    RBC 4.62 2023    HGB 14.6 2023    HCT 43 2023    MCV 94.8 2023     2023     No results found for: \"TSH\"  Lab Results   Component Value Date    HGBA1C 5.10 2023 " "           Objective     Vital Signs:  /78 (BP Location: Left arm, Patient Position: Sitting, Cuff Size: Adult)   Pulse 68   Ht 162.6 cm (64\")   Wt 83.9 kg (185 lb)   BMI 31.76 kg/m²   Estimated body mass index is 31.76 kg/m² as calculated from the following:    Height as of this encounter: 162.6 cm (64\").    Weight as of this encounter: 83.9 kg (185 lb).         Cardiovascular:      PMI at left midclavicular line. Normal rate. Regular rhythm. Normal S1. Normal S2.       Murmurs: There is a grade 3/6 high frequency blowing holosystolic murmur at the apex.      No gallop.  No click. No rub.   Pulses:     Intact distal pulses.   Edema:     Peripheral edema absent.             Assessment and Plan     Assessment & Plan  Chronic congestive heart failure with left ventricular diastolic dysfunction  NYHA FC2  ContinueJardiance and Entresto.  Continue Wegovy        Orders:    Basic Metabolic Panel; Future    Microalbumin / Creatinine Urine Ratio - Urine, Clean Catch; Future    Hemoglobin A1c; Future    proBNP; Future    Hyperlipidemia LDL goal <55   Needs lipid profile.  Continue Pravastatin.    Orders:    Hepatic Function Panel; Future    High Sensitivity CRP; Future    Lipoprotein A (LPA); Future    Lipid Panel; Future    CAD in native artery  Mild CAD without angina.  ASA81 mg po daily    Orders:    aspirin 81 MG EC tablet; Take 1 tablet by mouth Daily.    Chronic venous insufficiency of lower extremity  30 mm/hg knee high compression stockings.  Leg elevation x 30 minutes 3 times daily.  Increase your  walking schedule and distance covered.  Weight loss diet.           Abnormal finding of blood chemistry, unspecified    Orders:    Hemoglobin A1c; Future      Follow Up  Return in about 3 months (around 6/13/2025).    Quyen Smith MD    [facility]  "

## 2025-03-13 ENCOUNTER — OFFICE VISIT (OUTPATIENT)
Age: 70
End: 2025-03-13
Payer: MEDICARE

## 2025-03-13 VITALS
DIASTOLIC BLOOD PRESSURE: 78 MMHG | HEART RATE: 68 BPM | BODY MASS INDEX: 31.58 KG/M2 | WEIGHT: 185 LBS | HEIGHT: 64 IN | SYSTOLIC BLOOD PRESSURE: 126 MMHG

## 2025-03-13 DIAGNOSIS — R79.9 ABNORMAL FINDING OF BLOOD CHEMISTRY, UNSPECIFIED: ICD-10-CM

## 2025-03-13 DIAGNOSIS — I25.10 CAD IN NATIVE ARTERY: ICD-10-CM

## 2025-03-13 DIAGNOSIS — I50.32 CHRONIC CONGESTIVE HEART FAILURE WITH LEFT VENTRICULAR DIASTOLIC DYSFUNCTION: ICD-10-CM

## 2025-03-13 DIAGNOSIS — E78.5 HYPERLIPIDEMIA LDL GOAL <55: Primary | ICD-10-CM

## 2025-03-13 DIAGNOSIS — I87.2 CHRONIC VENOUS INSUFFICIENCY OF LOWER EXTREMITY: ICD-10-CM

## 2025-03-13 DIAGNOSIS — I25.10 CORONARY ARTERY DISEASE INVOLVING NATIVE CORONARY ARTERY OF NATIVE HEART, UNSPECIFIED WHETHER ANGINA PRESENT: ICD-10-CM

## 2025-03-13 RX ORDER — ERGOCALCIFEROL 1.25 MG/1
50000 CAPSULE, LIQUID FILLED ORAL
COMMUNITY
Start: 2025-03-07

## 2025-03-13 RX ORDER — SEMAGLUTIDE 2.4 MG/.75ML
2.4 INJECTION, SOLUTION SUBCUTANEOUS WEEKLY
Qty: 12 ML | Refills: 3 | Status: SHIPPED | OUTPATIENT
Start: 2025-03-13

## 2025-03-13 RX ORDER — ASPIRIN 81 MG/1
81 TABLET ORAL DAILY
Qty: 90 TABLET | Refills: 1 | Status: SHIPPED | OUTPATIENT
Start: 2025-03-13

## 2025-03-13 RX ORDER — SEMAGLUTIDE 2.4 MG/.75ML
INJECTION, SOLUTION SUBCUTANEOUS
COMMUNITY
Start: 2025-02-21 | End: 2025-03-13 | Stop reason: SDUPTHER

## 2025-03-13 RX ORDER — ATENOLOL 25 MG/1
25 TABLET ORAL DAILY
COMMUNITY
Start: 2025-01-19

## 2025-03-13 RX ORDER — OXYCODONE AND ACETAMINOPHEN 10; 325 MG/1; MG/1
1 TABLET ORAL EVERY 4 HOURS PRN
COMMUNITY

## 2025-03-13 RX ORDER — SPIRONOLACTONE 25 MG/1
25 TABLET ORAL DAILY
Qty: 30 TABLET | Refills: 11 | Status: SHIPPED | OUTPATIENT
Start: 2025-03-13

## 2025-03-13 NOTE — ASSESSMENT & PLAN NOTE
Mild CAD without angina.  ASA81 mg po daily    Orders:    aspirin 81 MG EC tablet; Take 1 tablet by mouth Daily.

## 2025-03-13 NOTE — ASSESSMENT & PLAN NOTE
NYHA FC2  ContinueJardiance and Entresto.  Continue Wegovy        Orders:    Basic Metabolic Panel; Future    Microalbumin / Creatinine Urine Ratio - Urine, Clean Catch; Future    Hemoglobin A1c; Future    proBNP; Future

## 2025-03-13 NOTE — ASSESSMENT & PLAN NOTE
30 mm/hg knee high compression stockings.  Leg elevation x 30 minutes 3 times daily.  Increase your  walking schedule and distance covered.  Weight loss diet.

## 2025-06-09 RX ORDER — ATENOLOL 25 MG/1
25 TABLET ORAL DAILY
Qty: 90 TABLET | Refills: 1 | Status: SHIPPED | OUTPATIENT
Start: 2025-06-09

## 2025-06-09 NOTE — TELEPHONE ENCOUNTER
FAXED REQUEST  Rx Refill Note  Requested Prescriptions     Signed Prescriptions Disp Refills    atenolol (TENORMIN) 25 MG tablet 90 tablet 1     Sig: Take 1 tablet by mouth Daily.     Authorizing Provider: AL ANTHONY     Ordering User: SAUMYA SIDDIQI      Last office visit with prescribing clinician: 3/13/2025   Last telemedicine visit with prescribing clinician: Visit date not found   Next office visit with prescribing clinician: 6/13/2025                        Pharmacy Info    Last Fill Date:  Rx Written Date:   Prescribed Qty:   Additional Details from Pharmacy:    Patient passed protocols per yahaira.         Saumya Siddiqi MA  06/09/25, 13:46 EDT

## 2025-06-12 NOTE — ASSESSMENT & PLAN NOTE
30 mm/hg knee high compression stockings.  Leg elevation x 30 minutes 3 times daily.  Increase your  walking schedule and distance covered.  Weight loss diet.  Schedule vein ablation

## 2025-06-12 NOTE — PROGRESS NOTES
"    Cardiology Established Patient Note     Name: Alida Phillips  :   1955  PCP: Judith Cervantes MD  Date:   2025  Department: [facility] CARDIOLOGY  [address] [facility] [address]  [facility] [address]  Fax [phone]  Phone [phone]    Chief Complaint:Nausea and RLE swelling and pain    Subjective     History of Present Illness  Alida Phillips is a 70 y.o. female who presents today for follow-up.  Patient has past nonobstructive coronary artery disease, chronic diastolic heart failure, hyperlipidemia, hypertension, status post above-the-knee amputation, chronic respiratory failure. Still SOB but improving.  Wearing compression stockings for 6 months without relief, still co of R calf pain and swelling, associated with pins and needles.  LDL 11 on pravastatin.  She has severe nausea -scheduled to see GI soon.  The following data was reviewed by: Quyen Smith MD     Left heart cath 2023 EF 58%, mild coronary artery disease, severely elevated LV filling pressure  2D echo 10/2024 EF 70%  2024 MCT sinus rhythm/sinus tachycardia  Right lower extremity venous duplex  2024 deep system reflux was absent, negative for DVT, CVI noted in the right SFJ and GSV.                      Objective     Vital Signs:  There were no vitals taken for this visit.  Estimated body mass index is 31.76 kg/m² as calculated from the following:    Height as of 3/13/25: 162.6 cm (64\").    Weight as of 3/13/25: 83.9 kg (185 lb).         Cardiovascular:      PMI at left midclavicular line. Normal rate. Regular rhythm. Normal S1. Normal S2.       Murmurs: There is a grade 3/6 high frequency blowing holosystolic murmur at the apex.      No gallop.  No click. No rub.   Pulses:     Intact distal pulses.   Edema:     Peripheral edema present.     Pretibial: 3+ edema of the left pretibial area.     Ankle: 3+ edema of the left ankle.     Feet: 3+ edema of the left foot.        "     Assessment and Plan     Assessment & Plan  Chronic diastolic heart failure  Stable  Pro BNP and BMP  Continue Aldacone, Jardiance,Entresto and Lasix  Consider stopping BB  Orders:    Comprehensive Metabolic Panel; Future    Microalbumin / Creatinine Urine Ratio - Urine, Clean Catch; Future    proBNP; Future    Hyperlipidemia LDL goal <55   Lipid panel    Orders:    Hepatic Function Panel; Future    High Sensitivity CRP; Future    Lipoprotein A (LPA); Future    Lipid Panel; Future    Chronic venous insufficiency of lower extremity  30 mm/hg knee high compression stockings.  Leg elevation x 30 minutes 3 times daily.  Increase your  walking schedule and distance covered.  Weight loss diet.  Schedule vein ablation         Follow Up  No follow-ups on file.    Quyen Smith MD    Psychiatric Cardiology

## 2025-06-12 NOTE — ASSESSMENT & PLAN NOTE
Lipid panel    Orders:    Hepatic Function Panel; Future    High Sensitivity CRP; Future    Lipoprotein A (LPA); Future    Lipid Panel; Future

## 2025-06-13 ENCOUNTER — OFFICE VISIT (OUTPATIENT)
Age: 70
End: 2025-06-13
Payer: MEDICARE

## 2025-06-13 ENCOUNTER — PATIENT ROUNDING (BHMG ONLY) (OUTPATIENT)
Age: 70
End: 2025-06-13

## 2025-06-13 VITALS
HEART RATE: 110 BPM | WEIGHT: 195.2 LBS | SYSTOLIC BLOOD PRESSURE: 108 MMHG | DIASTOLIC BLOOD PRESSURE: 71 MMHG | BODY MASS INDEX: 33.32 KG/M2 | HEIGHT: 64 IN

## 2025-06-13 DIAGNOSIS — I50.32 CHRONIC DIASTOLIC HEART FAILURE: Primary | ICD-10-CM

## 2025-06-13 DIAGNOSIS — E78.5 HYPERLIPIDEMIA LDL GOAL <55: ICD-10-CM

## 2025-06-13 DIAGNOSIS — I87.2 CHRONIC VENOUS INSUFFICIENCY OF LOWER EXTREMITY: ICD-10-CM

## 2025-06-13 DIAGNOSIS — I25.10 CAD IN NATIVE ARTERY: ICD-10-CM

## 2025-06-13 RX ORDER — ZOLPIDEM TARTRATE 5 MG/1
5 TABLET ORAL NIGHTLY PRN
COMMUNITY

## 2025-06-13 RX ORDER — PROMETHAZINE HYDROCHLORIDE 25 MG/1
12.5 TABLET ORAL EVERY 6 HOURS PRN
COMMUNITY

## 2025-06-13 RX ORDER — GABAPENTIN 300 MG/1
300 CAPSULE ORAL 2 TIMES DAILY
COMMUNITY

## 2025-06-13 RX ORDER — LEVOTHYROXINE SODIUM 88 UG/1
88 TABLET ORAL
COMMUNITY

## 2025-06-13 RX ORDER — METOPROLOL SUCCINATE 50 MG/1
50 TABLET, EXTENDED RELEASE ORAL DAILY
COMMUNITY

## 2025-06-13 RX ORDER — OMEPRAZOLE 40 MG/1
40 CAPSULE, DELAYED RELEASE ORAL DAILY
COMMUNITY
End: 2025-06-13

## 2025-06-13 RX ORDER — FUROSEMIDE 20 MG/1
20 TABLET ORAL DAILY
COMMUNITY
End: 2025-06-13

## 2025-06-13 RX ORDER — PREDNISONE 20 MG/1
TABLET ORAL
COMMUNITY

## 2025-06-13 RX ORDER — METOPROLOL SUCCINATE 25 MG/1
25 TABLET, EXTENDED RELEASE ORAL DAILY
COMMUNITY

## 2025-06-13 RX ORDER — ZOLMITRIPTAN 5 MG/1
5 TABLET, FILM COATED ORAL DAILY PRN
COMMUNITY

## 2025-06-13 RX ORDER — AMOXICILLIN 500 MG/1
500 CAPSULE ORAL 2 TIMES DAILY
COMMUNITY

## 2025-06-13 RX ORDER — VONOPRAZAN FUMARATE 13.36 MG/1
1 TABLET ORAL DAILY
COMMUNITY

## 2025-06-13 RX ORDER — AMOXICILLIN AND CLAVULANATE POTASSIUM 500; 125 MG/1; MG/1
1 TABLET, FILM COATED ORAL 2 TIMES DAILY
COMMUNITY

## 2025-06-13 NOTE — PROGRESS NOTES
My name is Pippa Duke, and I am the Practice Manager for [facility] [place].    I would like to thank you for being a loyal patient. If you do not mind, I would like to ask you some questions about your recent visit with us. Please feel free to reply if you wish to provide us with feedback on your visit with our practice.    First, could you tell me what went well with your recent visit?    Secondly, we are always looking for ways to make our patients' experiences even better. Do you have any recommendations on what we can do to improve your experience?    Finally, overall were you satisfied with your visit with us as a [facility] facility?    Over the next few days, you will be receiving a Patient Experience Survey. Please consider taking the survey, as it helps [facility] in improving their patient care.    Thank you for taking the time to answer our questions today.    I hope you have a good day.

## (undated) DEVICE — PK CATH CARD 10

## (undated) DEVICE — GLIDESHEATH BASIC HYDROPHILIC COATED INTRODUCER SHEATH: Brand: GLIDESHEATH

## (undated) DEVICE — CATH DIAG EXPO .056 FL3.5 6F 100CM

## (undated) DEVICE — CVR TRANSD FLX 3DIMEN 14X29.2CM LF STRL

## (undated) DEVICE — MODEL AT P65, P/N 701554-001KIT CONTENTS: HAND CONTROLLER, 3-WAY HIGH-PRESSURE STOPCOCK WITH ROTATING END AND PREMIUM HIGH-PRESSURE TUBING: Brand: ANGIOTOUCH® KIT

## (undated) DEVICE — CATH DIAG EXPO M/ PK 6FR FL4/FR4 PIG 3PK

## (undated) DEVICE — MODEL BT2000 P/N 700287-012KIT CONTENTS: MANIFOLD WITH SALINE AND CONTRAST PORTS, SALINE TUBING WITH SPIKE AND HAND SYRINGE, TRANSDUCER: Brand: BT2000 AUTOMATED MANIFOLD KIT

## (undated) DEVICE — GW PERIPH GUIDERIGHT STD/EXCHNG/J/TIP SS 0.035IN 5X260CM

## (undated) DEVICE — DEV COMPR RADL PRELUDESYNCEZ 30ML 32CM

## (undated) DEVICE — ADULT, W/LG. BACK PAD, RADIOTRANSPARENT ELEMENT AND LEAD WIRE: Brand: DEFIBRILLATION ELECTRODES